# Patient Record
Sex: FEMALE | Race: WHITE | NOT HISPANIC OR LATINO | URBAN - METROPOLITAN AREA
[De-identification: names, ages, dates, MRNs, and addresses within clinical notes are randomized per-mention and may not be internally consistent; named-entity substitution may affect disease eponyms.]

---

## 2023-09-07 ENCOUNTER — APPOINTMENT (RX ONLY)
Dept: URBAN - METROPOLITAN AREA CLINIC 71 | Facility: CLINIC | Age: 58
Setting detail: DERMATOLOGY
End: 2023-09-07

## 2023-09-07 DIAGNOSIS — Z41.9 ENCOUNTER FOR PROCEDURE FOR PURPOSES OTHER THAN REMEDYING HEALTH STATE, UNSPECIFIED: ICD-10-CM

## 2023-09-07 PROCEDURE — ? DERMAPLANE

## 2023-09-07 PROCEDURE — ? INVENTORY

## 2023-09-07 PROCEDURE — ? HYDRAFACIAL

## 2023-09-07 PROCEDURE — ? COSMETIC CONSULTATION: BOTULINUM TOXIN

## 2023-09-07 ASSESSMENT — LOCATION DETAILED DESCRIPTION DERM
LOCATION DETAILED: LEFT LOWER CUTANEOUS LIP
LOCATION DETAILED: RIGHT INFERIOR CENTRAL MALAR CHEEK
LOCATION DETAILED: RIGHT LOWER CUTANEOUS LIP
LOCATION DETAILED: RIGHT MEDIAL FOREHEAD
LOCATION DETAILED: NASAL DORSUM
LOCATION DETAILED: LEFT INFERIOR CENTRAL MALAR CHEEK

## 2023-09-07 ASSESSMENT — LOCATION SIMPLE DESCRIPTION DERM
LOCATION SIMPLE: RIGHT LIP
LOCATION SIMPLE: NOSE
LOCATION SIMPLE: LEFT CHEEK
LOCATION SIMPLE: RIGHT FOREHEAD
LOCATION SIMPLE: LEFT LIP
LOCATION SIMPLE: RIGHT CHEEK

## 2023-09-07 ASSESSMENT — LOCATION ZONE DERM
LOCATION ZONE: FACE
LOCATION ZONE: LIP
LOCATION ZONE: NOSE

## 2023-09-07 NOTE — PROCEDURE: DERMAPLANE
Treatment Area Prep: acetone
Pre-Procedure Text: The patient was placed in a recumbant position on the procedure table.
Blade: [place] scalpel
Post-Care Instructions: I reviewed with the patient in detail post-care instructions.
Detail Level: Zone
Treatment Areas: face and neck
Post-Procedure Instructions: Following the dermaplane procedure, Oxymist treatment was applied to the treatment areas. Moisturizer and SPF was applied.

## 2023-09-07 NOTE — PROCEDURE: HYDRAFACIAL
Tip: Hydropeel Tip, Teal
Vacuum Pressure High Setting (Will Not Render If Set To 0): 0
Solution Override
Treatment Number: 1
Consent: Written consent obtained, risks reviewed including but not limited to crusting, scabbing, blistering, scarring, darker or lighter pigmentary change, bruising, and/or incomplete response.
Tip: Hydropeel Tip, Blue
Solution: Beta-HD
Procedure: Extend and Protect
Post-Care Instructions: I reviewed with the patient in detail post-care instructions. Patient should stay away from the sun and wear sun protection until treated areas are fully healed.
Indication: skin texture
Procedure: Fusion
Procedure: Boost
Solution: GlySal 7.5%
Procedure: Exfoliation
Tip Override
Procedure: Extraction
Location: face
Tip: Hydropeel Tip, Clear
Solution: Activ-4
Procedure: Peel

## 2023-10-13 ENCOUNTER — APPOINTMENT (RX ONLY)
Dept: URBAN - METROPOLITAN AREA CLINIC 71 | Facility: CLINIC | Age: 58
Setting detail: DERMATOLOGY
End: 2023-10-13

## 2023-10-13 DIAGNOSIS — Z41.9 ENCOUNTER FOR PROCEDURE FOR PURPOSES OTHER THAN REMEDYING HEALTH STATE, UNSPECIFIED: ICD-10-CM

## 2023-10-13 PROCEDURE — ? CHEMICAL PEEL

## 2023-10-13 PROCEDURE — ? DERMAPLANE

## 2023-10-13 PROCEDURE — ? INVENTORY

## 2023-10-13 ASSESSMENT — LOCATION DETAILED DESCRIPTION DERM
LOCATION DETAILED: NASAL SUPRATIP
LOCATION DETAILED: RIGHT CHIN
LOCATION DETAILED: LEFT MEDIAL FOREHEAD
LOCATION DETAILED: RIGHT LOWER CUTANEOUS LIP
LOCATION DETAILED: LEFT CENTRAL MALAR CHEEK
LOCATION DETAILED: RIGHT CENTRAL MALAR CHEEK

## 2023-10-13 ASSESSMENT — LOCATION SIMPLE DESCRIPTION DERM
LOCATION SIMPLE: RIGHT CHEEK
LOCATION SIMPLE: CHIN
LOCATION SIMPLE: LEFT FOREHEAD
LOCATION SIMPLE: RIGHT LIP
LOCATION SIMPLE: LEFT CHEEK
LOCATION SIMPLE: NOSE

## 2023-10-13 ASSESSMENT — LOCATION ZONE DERM
LOCATION ZONE: FACE
LOCATION ZONE: NOSE
LOCATION ZONE: LIP

## 2023-10-13 NOTE — PROCEDURE: CHEMICAL PEEL
Treatment Time (Optional): 4
Post Peel Care: After the procedure instructions were reviewed with the patient.
Chemical Peel: Skinceuticals 2% Lactic Acid
Prep: The treated area was degreased with pre-peel cleanser, and vaseline was applied for protection of mucous membranes.
Post-Care Instructions: I reviewed with the patient in detail post-care instructions. Patient should avoid sun exposure and wear sun protection.
Treatment Number: 0
Consent: Prior to the procedure, written consent was obtained and risks were reviewed, including but not limited to: redness, peeling, blistering, pigmentary change, scarring, infection, and pain.
Number Of Layers: 1
Detail Level: Zone

## 2023-10-13 NOTE — PROCEDURE: DERMAPLANE
Blade: [place] scalpel
Post-Care Instructions: I reviewed with the patient in detail post-care instructions.
Treatment Areas: face and neck
Pre-Procedure Text: The patient was placed in a recumbant position on the procedure table.
Detail Level: Zone
Post-Procedure Instructions: Following the dermaplane procedure, Oxymist treatment was applied to the treatment areas. Moisturizer and SPF was applied.
Treatment Area Prep: acetone

## 2023-11-16 ENCOUNTER — APPOINTMENT (RX ONLY)
Dept: URBAN - METROPOLITAN AREA CLINIC 71 | Facility: CLINIC | Age: 58
Setting detail: DERMATOLOGY
End: 2023-11-16

## 2023-11-16 DIAGNOSIS — Z41.9 ENCOUNTER FOR PROCEDURE FOR PURPOSES OTHER THAN REMEDYING HEALTH STATE, UNSPECIFIED: ICD-10-CM

## 2023-11-16 PROCEDURE — ? HYDRAFACIAL

## 2023-11-16 PROCEDURE — ? INVENTORY

## 2023-11-16 PROCEDURE — ? DERMAPLANE

## 2023-11-16 ASSESSMENT — LOCATION DETAILED DESCRIPTION DERM
LOCATION DETAILED: LEFT CHIN
LOCATION DETAILED: LEFT MENTAL CREASE
LOCATION DETAILED: RIGHT INFERIOR CENTRAL MALAR CHEEK
LOCATION DETAILED: RIGHT MEDIAL FOREHEAD
LOCATION DETAILED: NASAL SUPRATIP
LOCATION DETAILED: LEFT INFERIOR CENTRAL MALAR CHEEK
LOCATION DETAILED: LEFT INFERIOR MEDIAL MALAR CHEEK

## 2023-11-16 ASSESSMENT — LOCATION ZONE DERM
LOCATION ZONE: FACE
LOCATION ZONE: NOSE

## 2023-11-16 ASSESSMENT — LOCATION SIMPLE DESCRIPTION DERM
LOCATION SIMPLE: RIGHT CHEEK
LOCATION SIMPLE: CHIN
LOCATION SIMPLE: NOSE
LOCATION SIMPLE: LEFT CHEEK
LOCATION SIMPLE: RIGHT FOREHEAD

## 2023-11-16 NOTE — PROCEDURE: HYDRAFACIAL
Post-Care Instructions: I reviewed with the patient in detail post-care instructions. Patient should stay away from the sun and wear sun protection until treated areas are fully healed.
Tip Override
Number Of Passes Step 5: 0
Procedure: Extraction
Procedure: Peel
Treatment Number: 1
Procedure: Boost
Tip: Hydropeel Tip, Blue
Procedure: Fusion
Procedure: Extend and Protect
Solution: Beta-HD
Solution: GlySal 7.5%
Solution Override
Indication: skin texture
Procedure: Exfoliation
Location: face
Solution: Activ-4
Tip: Hydropeel Tip, Teal
Tip: Hydropeel Tip, Clear
Consent: Written consent obtained, risks reviewed including but not limited to crusting, scabbing, blistering, scarring, darker or lighter pigmentary change, bruising, and/or incomplete response.

## 2023-11-16 NOTE — PROCEDURE: DERMAPLANE
Post-Care Instructions: I reviewed with the patient in detail post-care instructions.
Treatment Areas: face and neck
Pre-Procedure Text: The patient was placed in a recumbant position on the procedure table.
Detail Level: Zone
Post-Procedure Instructions: Following the dermaplane procedure, Oxymist treatment was applied to the treatment areas. Moisturizer and SPF was applied.
Treatment Area Prep: acetone
Blade: [place] scalpel

## 2023-11-21 ENCOUNTER — APPOINTMENT (RX ONLY)
Dept: URBAN - METROPOLITAN AREA CLINIC 71 | Facility: CLINIC | Age: 58
Setting detail: DERMATOLOGY
End: 2023-11-21

## 2023-11-21 DIAGNOSIS — Z41.9 ENCOUNTER FOR PROCEDURE FOR PURPOSES OTHER THAN REMEDYING HEALTH STATE, UNSPECIFIED: ICD-10-CM

## 2023-11-21 PROCEDURE — ? BOTOX

## 2023-11-21 NOTE — PROCEDURE: BOTOX
Show Additional Area 1: Yes
[facility] Units: 0
Consent: Written consent obtained. Risks include but not limited to lid/brow ptosis, bruising, swelling, diplopia, temporary effect, incomplete chemical denervation.
Show Ucl Units: No
Additional Area 1 Location: upper lip
Incrementing Botox Units: By 0.5 Units
Dilution (U/0.1 Cc): 1
Post-Care Instructions: Patient instructed to not lie down for 4 hours and limit physical activity for 24 hours.
Additional Area 1 Units: 4
Detail Level: Detailed
Inferior Lateral Orbicularis Oculi Units: 24
Show Inventory Tab: Show

## 2024-02-01 ENCOUNTER — APPOINTMENT (RX ONLY)
Dept: URBAN - METROPOLITAN AREA CLINIC 71 | Facility: CLINIC | Age: 59
Setting detail: DERMATOLOGY
End: 2024-02-01

## 2024-02-01 DIAGNOSIS — Z41.9 ENCOUNTER FOR PROCEDURE FOR PURPOSES OTHER THAN REMEDYING HEALTH STATE, UNSPECIFIED: ICD-10-CM

## 2024-02-01 PROCEDURE — ? INVENTORY

## 2024-02-01 PROCEDURE — ? MICRONEEDLING

## 2024-02-01 PROCEDURE — ? HYDRAFACIAL

## 2024-02-01 PROCEDURE — ? DERMAPLANE

## 2024-02-01 ASSESSMENT — LOCATION SIMPLE DESCRIPTION DERM
LOCATION SIMPLE: SUPERIOR FOREHEAD
LOCATION SIMPLE: NOSE
LOCATION SIMPLE: LEFT FOREHEAD
LOCATION SIMPLE: RIGHT CHEEK
LOCATION SIMPLE: LEFT CHEEK
LOCATION SIMPLE: LEFT LIP

## 2024-02-01 ASSESSMENT — LOCATION ZONE DERM
LOCATION ZONE: LIP
LOCATION ZONE: FACE
LOCATION ZONE: NOSE

## 2024-02-01 ASSESSMENT — LOCATION DETAILED DESCRIPTION DERM
LOCATION DETAILED: LEFT CENTRAL MALAR CHEEK
LOCATION DETAILED: LEFT MEDIAL MALAR CHEEK
LOCATION DETAILED: LEFT MEDIAL FOREHEAD
LOCATION DETAILED: NASAL DORSUM
LOCATION DETAILED: LEFT LOWER CUTANEOUS LIP
LOCATION DETAILED: RIGHT CENTRAL MALAR CHEEK
LOCATION DETAILED: SUPERIOR MID FOREHEAD

## 2024-02-01 NOTE — PROCEDURE: HYDRAFACIAL
Number Of Passes Step 6: 0
Procedure: Extraction
Procedure: Boost
Procedure: Fusion
Location: back
Procedure: Extend and Protect
Solution: Activ-4
Solution: GlySal 7.5%
Solution: Beta-HD
Solution Override
Treatment Number: 1
Tip: Hydropeel Tip, Blue
Tip: Hydropeel Tip, Teal
Tip: Hydropeel Tip, Clear
Consent: Written consent obtained, risks reviewed including but not limited to crusting, scabbing, blistering, scarring, darker or lighter pigmentary change, bruising, and/or incomplete response.
Indication: skin texture
Procedure: Peel
Tip Override
Procedure: Exfoliation
Post-Care Instructions: I reviewed with the patient in detail post-care instructions. Patient should stay away from the sun and wear sun protection until treated areas are fully healed.

## 2024-02-01 NOTE — PROCEDURE: DERMAPLANE
Blade: [place] scalpel
Post-Care Instructions: I reviewed with the patient in detail post-care instructions.
Treatment Areas: face and neck
Pre-Procedure Text: The patient was placed in a recumbant position on the procedure table.
Detail Level: Zone
Treatment Area Prep: acetone
Post-Procedure Instructions: Following the dermaplane procedure, Oxymist treatment was applied to the treatment areas. Moisturizer and SPF was applied.

## 2024-02-01 NOTE — PROCEDURE: MICRONEEDLING
Location #3: Nose
Depth In Mm (Location #2): 1.25
Post-Care Instructions: After the procedure, take precautions agains sun exposure. Do not apply sunscreen for 12 hours after the procedure. Do not apply make-up for 12 hours after the procedure. Avoid alcohol based toners for 10-14 days. After 2-3 days patients can return to their regular skin regimen.
Location #1: Forehead
Length Of Topical Anesthesia Application (Optional): 20 minutes
Topical Anesthesia?: 23% lidocaine, 7% tetracaine
# Of Treatments In Package: 0
Location #2: Cheeks
Depth In Mm (Location #1): 0.7
Depth In Mm (Location #3): 0.5
Consent: Written consent obtained, risks reviewed including but not limited to pain, scarring, infection and incomplete improvement.  Patient understands the procedure is cosmetic in nature and will require out of pocket payment.
Detail Level: Zone
Calcipotriene Pregnancy And Lactation Text: This medication has not been proven safe during pregnancy. It is unknown if this medication is excreted in breast milk.